# Patient Record
Sex: MALE | Race: WHITE | NOT HISPANIC OR LATINO | URBAN - NONMETROPOLITAN AREA
[De-identification: names, ages, dates, MRNs, and addresses within clinical notes are randomized per-mention and may not be internally consistent; named-entity substitution may affect disease eponyms.]

---

## 2019-04-26 ENCOUNTER — OFFICE VISIT CONVERTED (OUTPATIENT)
Dept: FAMILY MEDICINE CLINIC | Facility: CLINIC | Age: 69
End: 2019-04-26
Attending: NURSE PRACTITIONER

## 2019-04-26 ENCOUNTER — HOSPITAL ENCOUNTER (OUTPATIENT)
Dept: GENERAL RADIOLOGY | Facility: HOSPITAL | Age: 69
Discharge: HOME | End: 2019-04-26
Attending: NURSE PRACTITIONER

## 2019-04-26 LAB
ALBUMIN SERPL-MCNC: 4.2 G/DL (ref 3.5–5)
ALBUMIN/GLOB SERPL: 1.2 {RATIO} (ref 1.4–2.6)
ALP SERPL-CCNC: 90 U/L (ref 56–155)
ALT SERPL-CCNC: 17 U/L (ref 10–40)
ANION GAP SERPL CALC-SCNC: 15 MMOL/L (ref 8–19)
AST SERPL-CCNC: 19 U/L (ref 15–50)
BASOPHILS # BLD AUTO: 0.07 10*3/UL (ref 0–0.2)
BASOPHILS NFR BLD AUTO: 0.8 % (ref 0–3)
BILIRUB SERPL-MCNC: 0.44 MG/DL (ref 0.2–1.3)
BUN SERPL-MCNC: 15 MG/DL (ref 5–25)
BUN/CREAT SERPL: 17 {RATIO} (ref 6–20)
CALCIUM SERPL-MCNC: 9.3 MG/DL (ref 8.7–10.4)
CHLORIDE SERPL-SCNC: 98 MMOL/L (ref 99–111)
CHOLEST SERPL-MCNC: 202 MG/DL (ref 107–200)
CHOLEST/HDLC SERPL: 4.1 {RATIO} (ref 3–6)
CONV ABS IMM GRAN: 0.04 10*3/UL (ref 0–0.2)
CONV CO2: 29 MMOL/L (ref 22–32)
CONV IMMATURE GRAN: 0.5 % (ref 0–1.8)
CONV TOTAL PROTEIN: 7.6 G/DL (ref 6.3–8.2)
CREAT UR-MCNC: 0.89 MG/DL (ref 0.7–1.2)
DEPRECATED RDW RBC AUTO: 49.4 FL (ref 35.1–43.9)
EOSINOPHIL # BLD AUTO: 0.31 10*3/UL (ref 0–0.7)
EOSINOPHIL # BLD AUTO: 3.7 % (ref 0–7)
ERYTHROCYTE [DISTWIDTH] IN BLOOD BY AUTOMATED COUNT: 14.6 % (ref 11.6–14.4)
EST. AVERAGE GLUCOSE BLD GHB EST-MCNC: 123 MG/DL
GFR SERPLBLD BASED ON 1.73 SQ M-ARVRAT: >60 ML/MIN/{1.73_M2}
GLOBULIN UR ELPH-MCNC: 3.4 G/DL (ref 2–3.5)
GLUCOSE SERPL-MCNC: 114 MG/DL (ref 70–99)
HBA1C MFR BLD: 14.7 G/DL (ref 14–18)
HBA1C MFR BLD: 5.9 % (ref 3.5–5.7)
HCT VFR BLD AUTO: 48.1 % (ref 42–52)
HDLC SERPL-MCNC: 49 MG/DL (ref 40–60)
LDLC SERPL CALC-MCNC: 119 MG/DL (ref 70–100)
LYMPHOCYTES # BLD AUTO: 1.63 10*3/UL (ref 1–5)
MCH RBC QN AUTO: 28.1 PG (ref 27–31)
MCHC RBC AUTO-ENTMCNC: 30.6 G/DL (ref 33–37)
MCV RBC AUTO: 92 FL (ref 80–96)
MONOCYTES # BLD AUTO: 0.58 10*3/UL (ref 0.2–1.2)
MONOCYTES NFR BLD AUTO: 6.9 % (ref 3–10)
NEUTROPHILS # BLD AUTO: 5.75 10*3/UL (ref 2–8)
NEUTROPHILS NFR BLD AUTO: 68.6 % (ref 30–85)
NRBC CBCN: 0 % (ref 0–0.7)
OSMOLALITY SERPL CALC.SUM OF ELEC: 288 MOSM/KG (ref 273–304)
PLATELET # BLD AUTO: 347 10*3/UL (ref 130–400)
PMV BLD AUTO: 9.7 FL (ref 9.4–12.4)
POTASSIUM SERPL-SCNC: 4.2 MMOL/L (ref 3.5–5.3)
RBC # BLD AUTO: 5.23 10*6/UL (ref 4.7–6.1)
SODIUM SERPL-SCNC: 138 MMOL/L (ref 135–147)
TRIGL SERPL-MCNC: 172 MG/DL (ref 40–150)
TSH SERPL-ACNC: 1.79 M[IU]/L (ref 0.27–4.2)
VARIANT LYMPHS NFR BLD MANUAL: 19.5 % (ref 20–45)
VLDLC SERPL-MCNC: 34 MG/DL (ref 5–37)
WBC # BLD AUTO: 8.38 10*3/UL (ref 4.8–10.8)

## 2019-04-27 LAB
CONV HEPATITIS C AB WITH REFLEX TO CONFIRMATION: <0.1 S/CO RATIO (ref 0–0.9)
CONV HEPATITIS COMMENT: NORMAL

## 2019-05-14 ENCOUNTER — OFFICE VISIT CONVERTED (OUTPATIENT)
Dept: FAMILY MEDICINE CLINIC | Facility: CLINIC | Age: 69
End: 2019-05-14
Attending: NURSE PRACTITIONER

## 2020-01-16 ENCOUNTER — OFFICE VISIT CONVERTED (OUTPATIENT)
Dept: FAMILY MEDICINE CLINIC | Facility: CLINIC | Age: 70
End: 2020-01-16
Attending: FAMILY MEDICINE

## 2020-01-21 ENCOUNTER — HOSPITAL ENCOUNTER (OUTPATIENT)
Dept: GENERAL RADIOLOGY | Facility: HOSPITAL | Age: 70
Discharge: HOME | End: 2020-01-21
Attending: NURSE PRACTITIONER

## 2020-01-21 ENCOUNTER — OFFICE VISIT CONVERTED (OUTPATIENT)
Dept: FAMILY MEDICINE CLINIC | Facility: CLINIC | Age: 70
End: 2020-01-21
Attending: NURSE PRACTITIONER

## 2020-01-21 LAB
ALBUMIN SERPL-MCNC: 4 G/DL (ref 3.5–5)
ALBUMIN/GLOB SERPL: 1.2 {RATIO} (ref 1.4–2.6)
ALP SERPL-CCNC: 85 U/L (ref 56–155)
ALT SERPL-CCNC: 26 U/L (ref 10–40)
ANION GAP SERPL CALC-SCNC: 25 MMOL/L (ref 8–19)
AST SERPL-CCNC: 33 U/L (ref 15–50)
BASOPHILS # BLD AUTO: 0.07 10*3/UL (ref 0–0.2)
BASOPHILS NFR BLD AUTO: 0.7 % (ref 0–3)
BILIRUB SERPL-MCNC: 0.56 MG/DL (ref 0.2–1.3)
BUN SERPL-MCNC: 16 MG/DL (ref 5–25)
BUN/CREAT SERPL: 16 {RATIO} (ref 6–20)
CALCIUM SERPL-MCNC: 9.6 MG/DL (ref 8.7–10.4)
CHLORIDE SERPL-SCNC: 99 MMOL/L (ref 99–111)
CHOLEST SERPL-MCNC: 184 MG/DL (ref 107–200)
CHOLEST/HDLC SERPL: 4.2 {RATIO} (ref 3–6)
CONV ABS IMM GRAN: 0.12 10*3/UL (ref 0–0.2)
CONV CO2: 19 MMOL/L (ref 22–32)
CONV IMMATURE GRAN: 1.2 % (ref 0–1.8)
CONV TOTAL PROTEIN: 7.3 G/DL (ref 6.3–8.2)
CREAT UR-MCNC: 0.98 MG/DL (ref 0.7–1.2)
DEPRECATED RDW RBC AUTO: 50.8 FL (ref 35.1–43.9)
EOSINOPHIL # BLD AUTO: 0.63 10*3/UL (ref 0–0.7)
EOSINOPHIL # BLD AUTO: 6 % (ref 0–7)
ERYTHROCYTE [DISTWIDTH] IN BLOOD BY AUTOMATED COUNT: 15.5 % (ref 11.6–14.4)
EST. AVERAGE GLUCOSE BLD GHB EST-MCNC: 131 MG/DL
GFR SERPLBLD BASED ON 1.73 SQ M-ARVRAT: >60 ML/MIN/{1.73_M2}
GLOBULIN UR ELPH-MCNC: 3.3 G/DL (ref 2–3.5)
GLUCOSE SERPL-MCNC: 120 MG/DL (ref 70–99)
HBA1C MFR BLD: 6.2 % (ref 3.5–5.7)
HCT VFR BLD AUTO: 52.4 % (ref 42–52)
HDLC SERPL-MCNC: 44 MG/DL (ref 40–60)
HGB BLD-MCNC: 16 G/DL (ref 14–18)
LDLC SERPL CALC-MCNC: 106 MG/DL (ref 70–100)
LYMPHOCYTES # BLD AUTO: 1.54 10*3/UL (ref 1–5)
LYMPHOCYTES NFR BLD AUTO: 14.8 % (ref 20–45)
MCH RBC QN AUTO: 27.8 PG (ref 27–31)
MCHC RBC AUTO-ENTMCNC: 30.5 G/DL (ref 33–37)
MCV RBC AUTO: 91.1 FL (ref 80–96)
MONOCYTES # BLD AUTO: 0.78 10*3/UL (ref 0.2–1.2)
MONOCYTES NFR BLD AUTO: 7.5 % (ref 3–10)
NEUTROPHILS # BLD AUTO: 7.29 10*3/UL (ref 2–8)
NEUTROPHILS NFR BLD AUTO: 69.8 % (ref 30–85)
NRBC CBCN: 0 % (ref 0–0.7)
OSMOLALITY SERPL CALC.SUM OF ELEC: 290 MOSM/KG (ref 273–304)
PLATELET # BLD AUTO: 397 10*3/UL (ref 130–400)
PMV BLD AUTO: 9 FL (ref 9.4–12.4)
POTASSIUM SERPL-SCNC: 4.4 MMOL/L (ref 3.5–5.3)
PSA SERPL-MCNC: 3.56 NG/ML (ref 0–4)
RBC # BLD AUTO: 5.75 10*6/UL (ref 4.7–6.1)
SODIUM SERPL-SCNC: 139 MMOL/L (ref 135–147)
TRIGL SERPL-MCNC: 170 MG/DL (ref 40–150)
TSH SERPL-ACNC: 2.59 M[IU]/L (ref 0.27–4.2)
VLDLC SERPL-MCNC: 34 MG/DL (ref 5–37)
WBC # BLD AUTO: 10.43 10*3/UL (ref 4.8–10.8)

## 2020-02-04 ENCOUNTER — OFFICE VISIT CONVERTED (OUTPATIENT)
Dept: FAMILY MEDICINE CLINIC | Facility: CLINIC | Age: 70
End: 2020-02-04
Attending: NURSE PRACTITIONER

## 2020-02-11 ENCOUNTER — HOSPITAL ENCOUNTER (OUTPATIENT)
Dept: GENERAL RADIOLOGY | Facility: HOSPITAL | Age: 70
Discharge: HOME | End: 2020-02-11
Attending: NURSE PRACTITIONER

## 2021-05-15 VITALS
HEART RATE: 114 BPM | DIASTOLIC BLOOD PRESSURE: 63 MMHG | WEIGHT: 315 LBS | BODY MASS INDEX: 41.75 KG/M2 | SYSTOLIC BLOOD PRESSURE: 152 MMHG | TEMPERATURE: 97.3 F | OXYGEN SATURATION: 94 % | RESPIRATION RATE: 18 BRPM | HEIGHT: 73 IN

## 2021-05-15 VITALS
RESPIRATION RATE: 20 BRPM | BODY MASS INDEX: 41.75 KG/M2 | TEMPERATURE: 97.6 F | SYSTOLIC BLOOD PRESSURE: 156 MMHG | OXYGEN SATURATION: 98 % | DIASTOLIC BLOOD PRESSURE: 62 MMHG | HEIGHT: 73 IN | WEIGHT: 315 LBS | HEART RATE: 77 BPM

## 2021-05-15 VITALS
TEMPERATURE: 97.8 F | OXYGEN SATURATION: 99 % | SYSTOLIC BLOOD PRESSURE: 144 MMHG | WEIGHT: 315 LBS | RESPIRATION RATE: 20 BRPM | HEIGHT: 73 IN | HEART RATE: 80 BPM | BODY MASS INDEX: 41.75 KG/M2 | DIASTOLIC BLOOD PRESSURE: 74 MMHG

## 2021-05-15 VITALS
HEIGHT: 73 IN | OXYGEN SATURATION: 98 % | DIASTOLIC BLOOD PRESSURE: 74 MMHG | RESPIRATION RATE: 20 BRPM | SYSTOLIC BLOOD PRESSURE: 144 MMHG | HEART RATE: 72 BPM | WEIGHT: 315 LBS | TEMPERATURE: 97.5 F | BODY MASS INDEX: 41.75 KG/M2

## 2021-05-15 VITALS
BODY MASS INDEX: 41.75 KG/M2 | DIASTOLIC BLOOD PRESSURE: 79 MMHG | RESPIRATION RATE: 20 BRPM | OXYGEN SATURATION: 98 % | TEMPERATURE: 97.9 F | SYSTOLIC BLOOD PRESSURE: 136 MMHG | HEIGHT: 73 IN | HEART RATE: 86 BPM | WEIGHT: 315 LBS

## 2023-05-19 ENCOUNTER — APPOINTMENT (OUTPATIENT)
Dept: CARDIOLOGY | Facility: HOSPITAL | Age: 73
End: 2023-05-19
Payer: MEDICAID

## 2023-05-19 ENCOUNTER — HOSPITAL ENCOUNTER (OUTPATIENT)
Facility: HOSPITAL | Age: 73
Setting detail: OBSERVATION
LOS: 1 days | Discharge: HOME | End: 2023-05-20
Attending: EMERGENCY MEDICINE | Admitting: FAMILY MEDICINE
Payer: MEDICAID

## 2023-05-19 DIAGNOSIS — I82.412 ACUTE DEEP VEIN THROMBOSIS (DVT) OF FEMORAL VEIN OF LEFT LOWER EXTREMITY: Primary | ICD-10-CM

## 2023-05-19 LAB
ALBUMIN SERPL-MCNC: 3.7 G/DL (ref 3.5–5.2)
ALBUMIN/GLOB SERPL: 1 G/DL
ALP SERPL-CCNC: 96 U/L (ref 39–117)
ALT SERPL W P-5'-P-CCNC: 16 U/L (ref 1–41)
ANION GAP SERPL CALCULATED.3IONS-SCNC: 10.5 MMOL/L (ref 5–15)
AST SERPL-CCNC: 23 U/L (ref 1–40)
BASOPHILS # BLD AUTO: 0.1 10*3/MM3 (ref 0–0.2)
BASOPHILS NFR BLD AUTO: 0.8 % (ref 0–1.5)
BILIRUB SERPL-MCNC: 0.7 MG/DL (ref 0–1.2)
BUN SERPL-MCNC: 12 MG/DL (ref 8–23)
BUN/CREAT SERPL: 15.4 (ref 7–25)
CALCIUM SPEC-SCNC: 9.2 MG/DL (ref 8.6–10.5)
CHLORIDE SERPL-SCNC: 100 MMOL/L (ref 98–107)
CO2 SERPL-SCNC: 27.5 MMOL/L (ref 22–29)
CREAT SERPL-MCNC: 0.78 MG/DL (ref 0.76–1.27)
DEPRECATED RDW RBC AUTO: 46 FL (ref 37–54)
EGFRCR SERPLBLD CKD-EPI 2021: 95.3 ML/MIN/1.73
EOSINOPHIL # BLD AUTO: 0.4 10*3/MM3 (ref 0–0.4)
EOSINOPHIL NFR BLD AUTO: 3.2 % (ref 0.3–6.2)
ERYTHROCYTE [DISTWIDTH] IN BLOOD BY AUTOMATED COUNT: 14.3 % (ref 12.3–15.4)
GLOBULIN UR ELPH-MCNC: 3.8 GM/DL
GLUCOSE SERPL-MCNC: 115 MG/DL (ref 65–99)
HCT VFR BLD AUTO: 46.3 % (ref 37.5–51)
HGB BLD-MCNC: 15.3 G/DL (ref 13–17.7)
HOLD SPECIMEN: NORMAL
HOLD SPECIMEN: NORMAL
IMM GRANULOCYTES # BLD AUTO: 0.04 10*3/MM3 (ref 0–0.05)
IMM GRANULOCYTES NFR BLD AUTO: 0.3 % (ref 0–0.5)
INR PPP: 1.09 (ref 0.86–1.15)
LYMPHOCYTES # BLD AUTO: 1.45 10*3/MM3 (ref 0.7–3.1)
LYMPHOCYTES NFR BLD AUTO: 11.6 % (ref 19.6–45.3)
MCH RBC QN AUTO: 29.2 PG (ref 26.6–33)
MCHC RBC AUTO-ENTMCNC: 33 G/DL (ref 31.5–35.7)
MCV RBC AUTO: 88.4 FL (ref 79–97)
MONOCYTES # BLD AUTO: 0.69 10*3/MM3 (ref 0.1–0.9)
MONOCYTES NFR BLD AUTO: 5.5 % (ref 5–12)
NEUTROPHILS NFR BLD AUTO: 78.6 % (ref 42.7–76)
NEUTROPHILS NFR BLD AUTO: 9.87 10*3/MM3 (ref 1.7–7)
NRBC BLD AUTO-RTO: 0 /100 WBC (ref 0–0.2)
PLATELET # BLD AUTO: 407 10*3/MM3 (ref 140–450)
PMV BLD AUTO: 8.4 FL (ref 6–12)
POTASSIUM SERPL-SCNC: 3.7 MMOL/L (ref 3.5–5.2)
PROT SERPL-MCNC: 7.5 G/DL (ref 6–8.5)
PROTHROMBIN TIME: 14.2 SECONDS (ref 11.8–14.9)
RBC # BLD AUTO: 5.24 10*6/MM3 (ref 4.14–5.8)
SODIUM SERPL-SCNC: 138 MMOL/L (ref 136–145)
WBC NRBC COR # BLD: 12.55 10*3/MM3 (ref 3.4–10.8)
WHOLE BLOOD HOLD COAG: NORMAL
WHOLE BLOOD HOLD SPECIMEN: NORMAL

## 2023-05-19 PROCEDURE — 85025 COMPLETE CBC W/AUTO DIFF WBC: CPT

## 2023-05-19 PROCEDURE — 99285 EMERGENCY DEPT VISIT HI MDM: CPT

## 2023-05-19 PROCEDURE — 85610 PROTHROMBIN TIME: CPT

## 2023-05-19 PROCEDURE — 80053 COMPREHEN METABOLIC PANEL: CPT

## 2023-05-19 PROCEDURE — 93971 EXTREMITY STUDY: CPT

## 2023-05-19 PROCEDURE — 36415 COLL VENOUS BLD VENIPUNCTURE: CPT | Performed by: EMERGENCY MEDICINE

## 2023-05-19 PROCEDURE — G0378 HOSPITAL OBSERVATION PER HR: HCPCS

## 2023-05-19 PROCEDURE — 93971 EXTREMITY STUDY: CPT | Performed by: SURGERY

## 2023-05-19 RX ORDER — HEPARIN SOD,PORCINE/0.9 % NACL 25000/250
11.99 INTRAVENOUS SOLUTION INTRAVENOUS
Status: DISCONTINUED | OUTPATIENT
Start: 2023-05-20 | End: 2023-05-20

## 2023-05-19 NOTE — ED PROVIDER NOTES
"Time: 6:19 PM EDT  Date of encounter:  5/19/2023  Independent Historian/Clinical History and Information was obtained by:   Patient  Chief Complaint   Patient presents with   • Leg Pain     Pt was climbing a ladder a few days ago, states he was fine then, now having left leg swelling and pain       History is limited by: N/A    History of Present Illness:  Patient is a 72 y.o. year old male who presents to the emergency department for evaluation of left leg swelling and pain in popliteal fossa.  Patient denies any injury.  States this started several days ago and has progressively worsened.  Denies chest pain or shortness of breath.    HPI    Patient Care Team  Primary Care Provider: No primary care provider on file.    Past Medical History:     Allergies   Allergen Reactions   • Penicillins Unknown - High Severity     No past medical history on file.  No past surgical history on file.  No family history on file.    Home Medications:  Prior to Admission medications    Not on File        Social History:          Review of Systems:  Review of Systems   Cardiovascular: Positive for leg swelling (Left). Negative for chest pain.        Physical Exam:  /66   Pulse 82   Temp 98.4 °F (36.9 °C) (Oral)   Resp 18   Ht 185.4 cm (73\")   Wt (!) 150 kg (330 lb 11 oz)   SpO2 98%   BMI 43.63 kg/m²     Physical Exam  Constitutional:       Appearance: Normal appearance.   HENT:      Head: Normocephalic.   Eyes:      Extraocular Movements: Extraocular movements intact.      Conjunctiva/sclera: Conjunctivae normal.   Pulmonary:      Effort: Pulmonary effort is normal.   Abdominal:      General: There is no distension.   Skin:     General: Skin is warm.      Coloration: Skin is not cyanotic.   Neurological:      Mental Status: He is alert and oriented to person, place, and time.   Psychiatric:         Attention and Perception: Attention and perception normal.         Mood and Affect: Mood normal.            "       Procedures:  Procedures      Medical Decision Making:      Comorbidities that affect care:    {Comorbidities that affect care:94412}    External Notes reviewed:    {External Note review (Optional):84861}      The following orders were placed and all results were independently analyzed by me:  Orders Placed This Encounter   Procedures   • [place] Draw   • Comprehensive Metabolic Panel   • Protime-INR   • CBC Auto Differential   • NPO Diet NPO Type: Strict NPO   • Undress & Gown   • CBC & Differential   • Green Top (Gel)   • Lavender Top   • Gold Top - SST   • Light Blue Top       Medications Given in the Emergency Department:  Medications - No data to display     ED Course:    The patient was initially evaluated in the triage area where orders were placed. The patient was later dispositioned by [facility].      The patient was advised to stay for completion of workup which includes but is not limited to communication of labs and radiological results, reassessment and plan. The patient was advised that leaving prior to disposition by a provider could result in critical findings that are not communicated to the patient.     ED Course as of 05/21/23 1757   Fri May 19, 2023   1915 Per Lesa vascular tech:Venous study is positive for DVT from the left groin and extends all the way down the leg. Positive for SVT left GSV in the thigh. [CW]      ED Course User Index  [CW] Alyson Still APRN       Labs:    Lab Results (last 24 hours)     ** No results found for the last 24 hours. **           Imaging:    No Radiology Exams Resulted Within Past 24 Hours      Differential Diagnosis and Discussion:      {Differentials:48971}    {Independent Review of (Optional):15346}    MDM     {Critical Care:26285}    Patient Care Considerations:    {Considerations (Optional):92700}      Consultants/Shared Management Plan:    {Shared Management Plan (Optional):40679}    Social Determinants of Health:    {Social  Determinants of Health (Optional):09218}      Disposition and Care Coordination:    {Admission consideration:03410}    {Discharge (Optional):30807}    Final diagnoses:   None        ED Disposition     None          This medical record created using voice recognition software.

## 2023-05-20 ENCOUNTER — ANESTHESIA (OUTPATIENT)
Dept: TELEMETRY | Facility: HOSPITAL | Age: 73
End: 2023-05-20

## 2023-05-20 ENCOUNTER — ANESTHESIA EVENT (OUTPATIENT)
Dept: TELEMETRY | Facility: HOSPITAL | Age: 73
End: 2023-05-20

## 2023-05-20 ENCOUNTER — READMISSION MANAGEMENT (OUTPATIENT)
Dept: CALL CENTER | Facility: HOSPITAL | Age: 73
End: 2023-05-20
Payer: MEDICAID

## 2023-05-20 ENCOUNTER — APPOINTMENT (OUTPATIENT)
Dept: CT IMAGING | Facility: HOSPITAL | Age: 73
End: 2023-05-20
Payer: MEDICAID

## 2023-05-20 VITALS
SYSTOLIC BLOOD PRESSURE: 163 MMHG | RESPIRATION RATE: 18 BRPM | HEIGHT: 73 IN | OXYGEN SATURATION: 100 % | DIASTOLIC BLOOD PRESSURE: 70 MMHG | BODY MASS INDEX: 41.75 KG/M2 | TEMPERATURE: 97.9 F | WEIGHT: 315 LBS | HEART RATE: 85 BPM

## 2023-05-20 PROBLEM — I82.412 ACUTE DEEP VEIN THROMBOSIS (DVT) OF FEMORAL VEIN OF LEFT LOWER EXTREMITY: Status: ACTIVE | Noted: 2023-05-20

## 2023-05-20 LAB
APTT PPP: 154.7 SECONDS (ref 78–95.9)
APTT PPP: 28.8 SECONDS (ref 78–95.9)
APTT PPP: 31.6 SECONDS (ref 78–95.9)
BASOPHILS # BLD AUTO: 0.1 10*3/MM3 (ref 0–0.2)
BASOPHILS NFR BLD AUTO: 0.8 % (ref 0–1.5)
BH CV LOW VAS LEFT COMMON FEMORAL SPONT: 1
BH CV LOW VAS LEFT GASTRONEMIUS VESSEL: 1
BH CV LOW VAS LEFT GREATER SAPH AK VESSEL: 1
BH CV LOW VAS LEFT MID FEMORAL SPONT: 1
BH CV LOW VAS LEFT PERONEAL VESSEL: 1
BH CV LOW VAS LEFT POPLITEAL SPONT: 1
BH CV LOW VAS LEFT POSTERIOR TIBIAL VESSEL: 1
BH CV LOW VAS LEFT PROXIMAL FEMORAL SPONT: 1
BH CV LOWER VASCULAR LEFT COMMON FEMORAL AUGMENT: NORMAL
BH CV LOWER VASCULAR LEFT COMMON FEMORAL COMPETENT: NORMAL
BH CV LOWER VASCULAR LEFT COMMON FEMORAL COMPRESS: NORMAL
BH CV LOWER VASCULAR LEFT COMMON FEMORAL PHASIC: NORMAL
BH CV LOWER VASCULAR LEFT COMMON FEMORAL SPONT: NORMAL
BH CV LOWER VASCULAR LEFT COMMON FEMORAL THROMBUS: NORMAL
BH CV LOWER VASCULAR LEFT DISTAL FEMORAL COMPRESS: NORMAL
BH CV LOWER VASCULAR LEFT DISTAL FEMORAL THROMBUS: NORMAL
BH CV LOWER VASCULAR LEFT GASTRONEMIUS COMPRESS: NORMAL
BH CV LOWER VASCULAR LEFT GASTRONEMIUS THROMBUS: NORMAL
BH CV LOWER VASCULAR LEFT GREATER SAPH AK COMPRESS: NORMAL
BH CV LOWER VASCULAR LEFT GREATER SAPH AK THROMBUS: NORMAL
BH CV LOWER VASCULAR LEFT GREATER SAPH BK COMPRESS: NORMAL
BH CV LOWER VASCULAR LEFT LESSER SAPH COMPRESS: NORMAL
BH CV LOWER VASCULAR LEFT MID FEMORAL AUGMENT: NORMAL
BH CV LOWER VASCULAR LEFT MID FEMORAL COMPETENT: NORMAL
BH CV LOWER VASCULAR LEFT MID FEMORAL COMPRESS: NORMAL
BH CV LOWER VASCULAR LEFT MID FEMORAL PHASIC: NORMAL
BH CV LOWER VASCULAR LEFT MID FEMORAL SPONT: NORMAL
BH CV LOWER VASCULAR LEFT MID FEMORAL THROMBUS: NORMAL
BH CV LOWER VASCULAR LEFT PERONEAL COMPRESS: NORMAL
BH CV LOWER VASCULAR LEFT PERONEAL THROMBUS: NORMAL
BH CV LOWER VASCULAR LEFT POPLITEAL AUGMENT: NORMAL
BH CV LOWER VASCULAR LEFT POPLITEAL COMPETENT: NORMAL
BH CV LOWER VASCULAR LEFT POPLITEAL COMPRESS: NORMAL
BH CV LOWER VASCULAR LEFT POPLITEAL PHASIC: NORMAL
BH CV LOWER VASCULAR LEFT POPLITEAL SPONT: NORMAL
BH CV LOWER VASCULAR LEFT POPLITEAL THROMBUS: NORMAL
BH CV LOWER VASCULAR LEFT POSTERIOR TIBIAL AUGMENT: NORMAL
BH CV LOWER VASCULAR LEFT POSTERIOR TIBIAL COMPRESS: NORMAL
BH CV LOWER VASCULAR LEFT POSTERIOR TIBIAL THROMBUS: NORMAL
BH CV LOWER VASCULAR LEFT PROXIMAL FEMORAL AUGMENT: NORMAL
BH CV LOWER VASCULAR LEFT PROXIMAL FEMORAL COMPETENT: NORMAL
BH CV LOWER VASCULAR LEFT PROXIMAL FEMORAL COMPRESS: NORMAL
BH CV LOWER VASCULAR LEFT PROXIMAL FEMORAL PHASIC: NORMAL
BH CV LOWER VASCULAR LEFT PROXIMAL FEMORAL SPONT: NORMAL
BH CV LOWER VASCULAR LEFT PROXIMAL FEMORAL THROMBUS: NORMAL
BH CV LOWER VASCULAR RIGHT COMMON FEMORAL AUGMENT: NORMAL
BH CV LOWER VASCULAR RIGHT COMMON FEMORAL COMPETENT: NORMAL
BH CV LOWER VASCULAR RIGHT COMMON FEMORAL COMPRESS: NORMAL
BH CV LOWER VASCULAR RIGHT COMMON FEMORAL PHASIC: NORMAL
BH CV LOWER VASCULAR RIGHT COMMON FEMORAL SPONT: NORMAL
BH CV VAS PRELIMINARY FINDINGS SCRIPTING: 1
DEPRECATED RDW RBC AUTO: 45.9 FL (ref 37–54)
EOSINOPHIL # BLD AUTO: 0.58 10*3/MM3 (ref 0–0.4)
EOSINOPHIL NFR BLD AUTO: 4.9 % (ref 0.3–6.2)
ERYTHROCYTE [DISTWIDTH] IN BLOOD BY AUTOMATED COUNT: 14.3 % (ref 12.3–15.4)
HCT VFR BLD AUTO: 44.7 % (ref 37.5–51)
HGB BLD-MCNC: 14.7 G/DL (ref 13–17.7)
IMM GRANULOCYTES # BLD AUTO: 0.04 10*3/MM3 (ref 0–0.05)
IMM GRANULOCYTES NFR BLD AUTO: 0.3 % (ref 0–0.5)
LYMPHOCYTES # BLD AUTO: 1.73 10*3/MM3 (ref 0.7–3.1)
LYMPHOCYTES NFR BLD AUTO: 14.5 % (ref 19.6–45.3)
MAXIMAL PREDICTED HEART RATE: 149 BPM
MCH RBC QN AUTO: 28.8 PG (ref 26.6–33)
MCHC RBC AUTO-ENTMCNC: 32.9 G/DL (ref 31.5–35.7)
MCV RBC AUTO: 87.5 FL (ref 79–97)
MONOCYTES # BLD AUTO: 0.78 10*3/MM3 (ref 0.1–0.9)
MONOCYTES NFR BLD AUTO: 6.5 % (ref 5–12)
NEUTROPHILS NFR BLD AUTO: 73 % (ref 42.7–76)
NEUTROPHILS NFR BLD AUTO: 8.7 10*3/MM3 (ref 1.7–7)
NRBC BLD AUTO-RTO: 0 /100 WBC (ref 0–0.2)
PLATELET # BLD AUTO: 407 10*3/MM3 (ref 140–450)
PMV BLD AUTO: 8.1 FL (ref 6–12)
RBC # BLD AUTO: 5.11 10*6/MM3 (ref 4.14–5.8)
STRESS TARGET HR: 127 BPM
WBC NRBC COR # BLD: 11.93 10*3/MM3 (ref 3.4–10.8)

## 2023-05-20 PROCEDURE — G0378 HOSPITAL OBSERVATION PER HR: HCPCS

## 2023-05-20 PROCEDURE — 99204 OFFICE O/P NEW MOD 45 MIN: CPT | Performed by: SURGERY

## 2023-05-20 PROCEDURE — 25010000002 HEPARIN (PORCINE) PER 1000 UNITS: Performed by: FAMILY MEDICINE

## 2023-05-20 PROCEDURE — 94761 N-INVAS EAR/PLS OXIMETRY MLT: CPT

## 2023-05-20 PROCEDURE — 96365 THER/PROPH/DIAG IV INF INIT: CPT

## 2023-05-20 PROCEDURE — 99236 HOSP IP/OBS SAME DATE HI 85: CPT | Performed by: INTERNAL MEDICINE

## 2023-05-20 PROCEDURE — 25010000002 HEPARIN (PORCINE) PER 1000 UNITS: Performed by: NURSE PRACTITIONER

## 2023-05-20 PROCEDURE — 71260 CT THORAX DX C+: CPT

## 2023-05-20 PROCEDURE — 96366 THER/PROPH/DIAG IV INF ADDON: CPT

## 2023-05-20 PROCEDURE — 85730 THROMBOPLASTIN TIME PARTIAL: CPT | Performed by: NURSE PRACTITIONER

## 2023-05-20 PROCEDURE — 85730 THROMBOPLASTIN TIME PARTIAL: CPT | Performed by: INTERNAL MEDICINE

## 2023-05-20 PROCEDURE — 85025 COMPLETE CBC W/AUTO DIFF WBC: CPT | Performed by: NURSE PRACTITIONER

## 2023-05-20 PROCEDURE — 25510000001 IOPAMIDOL PER 1 ML: Performed by: FAMILY MEDICINE

## 2023-05-20 PROCEDURE — 94799 UNLISTED PULMONARY SVC/PX: CPT

## 2023-05-20 PROCEDURE — 96376 TX/PRO/DX INJ SAME DRUG ADON: CPT

## 2023-05-20 RX ORDER — AMOXICILLIN 250 MG
2 CAPSULE ORAL 2 TIMES DAILY
Status: DISCONTINUED | OUTPATIENT
Start: 2023-05-20 | End: 2023-05-20 | Stop reason: HOSPADM

## 2023-05-20 RX ORDER — SODIUM CHLORIDE 9 MG/ML
40 INJECTION, SOLUTION INTRAVENOUS AS NEEDED
Status: DISCONTINUED | OUTPATIENT
Start: 2023-05-20 | End: 2023-05-20 | Stop reason: HOSPADM

## 2023-05-20 RX ORDER — BISACODYL 10 MG
10 SUPPOSITORY, RECTAL RECTAL DAILY PRN
Status: DISCONTINUED | OUTPATIENT
Start: 2023-05-20 | End: 2023-05-20 | Stop reason: HOSPADM

## 2023-05-20 RX ORDER — SODIUM CHLORIDE 0.9 % (FLUSH) 0.9 %
10 SYRINGE (ML) INJECTION EVERY 12 HOURS SCHEDULED
Status: DISCONTINUED | OUTPATIENT
Start: 2023-05-20 | End: 2023-05-20 | Stop reason: HOSPADM

## 2023-05-20 RX ORDER — BENZONATATE 100 MG/1
100 CAPSULE ORAL 3 TIMES DAILY PRN
Status: DISCONTINUED | OUTPATIENT
Start: 2023-05-20 | End: 2023-05-20 | Stop reason: HOSPADM

## 2023-05-20 RX ORDER — BISACODYL 5 MG/1
5 TABLET, DELAYED RELEASE ORAL DAILY PRN
Status: DISCONTINUED | OUTPATIENT
Start: 2023-05-20 | End: 2023-05-20 | Stop reason: HOSPADM

## 2023-05-20 RX ORDER — SODIUM CHLORIDE 0.9 % (FLUSH) 0.9 %
10 SYRINGE (ML) INJECTION AS NEEDED
Status: DISCONTINUED | OUTPATIENT
Start: 2023-05-20 | End: 2023-05-20 | Stop reason: HOSPADM

## 2023-05-20 RX ORDER — SODIUM CHLORIDE, SODIUM LACTATE, POTASSIUM CHLORIDE, CALCIUM CHLORIDE 600; 310; 30; 20 MG/100ML; MG/100ML; MG/100ML; MG/100ML
9 INJECTION, SOLUTION INTRAVENOUS CONTINUOUS PRN
Status: CANCELLED | OUTPATIENT
Start: 2023-05-20

## 2023-05-20 RX ORDER — POLYETHYLENE GLYCOL 3350 17 G/17G
17 POWDER, FOR SOLUTION ORAL DAILY PRN
Status: DISCONTINUED | OUTPATIENT
Start: 2023-05-20 | End: 2023-05-20 | Stop reason: HOSPADM

## 2023-05-20 RX ORDER — NITROGLYCERIN 0.4 MG/1
0.4 TABLET SUBLINGUAL
Status: DISCONTINUED | OUTPATIENT
Start: 2023-05-20 | End: 2023-05-20 | Stop reason: HOSPADM

## 2023-05-20 RX ORDER — METOCLOPRAMIDE HYDROCHLORIDE 5 MG/ML
10 INJECTION INTRAMUSCULAR; INTRAVENOUS ONCE AS NEEDED
Status: CANCELLED | OUTPATIENT
Start: 2023-05-20

## 2023-05-20 RX ADMIN — HEPARIN SODIUM 11.99 UNITS/KG/HR: 5000 INJECTION INTRAVENOUS; SUBCUTANEOUS at 01:05

## 2023-05-20 RX ADMIN — Medication 10 ML: at 02:30

## 2023-05-20 RX ADMIN — IOPAMIDOL 100 ML: 755 INJECTION, SOLUTION INTRAVENOUS at 02:24

## 2023-05-20 RX ADMIN — BENZONATATE 100 MG: 100 CAPSULE ORAL at 05:19

## 2023-05-20 RX ADMIN — APIXABAN 10 MG: 5 TABLET, FILM COATED ORAL at 12:38

## 2023-05-20 NOTE — PLAN OF CARE
Problem: Adult Inpatient Plan of Care  Goal: Plan of Care Review  Outcome: Ongoing, Progressing  Goal: Patient-Specific Goal (Individualized)  Outcome: Ongoing, Progressing  Goal: Absence of Hospital-Acquired Illness or Injury  Outcome: Ongoing, Progressing  Intervention: Identify and Manage Fall Risk  Recent Flowsheet Documentation  Taken 5/20/2023 0151 by Gali Santizo RN  Safety Promotion/Fall Prevention: safety round/check completed  Intervention: Prevent Skin Injury  Recent Flowsheet Documentation  Taken 5/20/2023 0151 by Gali Santizo RN  Body Position: position changed independently  Intervention: Prevent and Manage VTE (Venous Thromboembolism) Risk  Recent Flowsheet Documentation  Taken 5/20/2023 0151 by Gali Santizo RN  Activity Management: up ad apryl  Goal: Optimal Comfort and Wellbeing  Outcome: Ongoing, Progressing  Intervention: Provide Person-Centered Care  Recent Flowsheet Documentation  Taken 5/20/2023 0151 by Gali Santizo RN  Trust Relationship/Rapport:   care explained   choices provided   emotional support provided   empathic listening provided   questions answered   questions encouraged   reassurance provided   thoughts/feelings acknowledged  Goal: Readiness for Transition of Care  Outcome: Ongoing, Progressing  Intervention: Mutually Develop Transition Plan  Recent Flowsheet Documentation  Taken 5/20/2023 0154 by Gali Santizo RN  Transportation Anticipated: family or friend will provide  Patient/Family Anticipated Services at Transition: none  Patient/Family Anticipates Transition to: home  Taken 5/20/2023 0148 by Gali Santizo RN  Equipment Currently Used at Home: none     Problem: Hypertension Comorbidity  Goal: Blood Pressure in Desired Range  Outcome: Ongoing, Progressing   Goal Outcome Evaluation:

## 2023-05-20 NOTE — ANESTHESIA PREPROCEDURE EVALUATION
Anesthesia Evaluation     Patient summary reviewed and Nursing notes reviewed   no history of anesthetic complications:  NPO Solid Status: > 8 hours  NPO Liquid Status: > 2 hours           Airway   Mallampati: II  TM distance: >3 FB  Neck ROM: full  No difficulty expected  Dental      Pulmonary - negative pulmonary ROS and normal exam    breath sounds clear to auscultation  Cardiovascular - normal exam  Exercise tolerance: good (4-7 METS)    Rhythm: regular  Rate: normal    (+) DVT,       Neuro/Psych- negative ROS  GI/Hepatic/Renal/Endo - negative ROS     Musculoskeletal     Abdominal    Substance History - negative use     OB/GYN negative ob/gyn ROS         Other   arthritis,      ROS/Med Hx Other: Left leg pain after standing on ladder a few days ago          Latest Reference Range & Units 05/19/23 19:54   Glucose 65 - 99 mg/dL 115 (H)   Sodium 136 - 145 mmol/L 138   Potassium 3.5 - 5.2 mmol/L 3.7   CO2 22.0 - 29.0 mmol/L 27.5   Chloride 98 - 107 mmol/L 100   Anion Gap 5.0 - 15.0 mmol/L 10.5   Creatinine 0.76 - 1.27 mg/dL 0.78   BUN 8 - 23 mg/dL 12   BUN/Creatinine Ratio 7.0 - 25.0  15.4   Calcium 8.6 - 10.5 mg/dL 9.2   eGFR >60.0 mL/min/1.73 95.3   Alkaline Phosphatase 39 - 117 U/L 96   Total Protein 6.0 - 8.5 g/dL 7.5   ALT (SGPT) 1 - 41 U/L 16   AST (SGOT) 1 - 40 U/L 23   Total Bilirubin 0.0 - 1.2 mg/dL 0.7   Albumin 3.5 - 5.2 g/dL 3.7   Globulin gm/dL 3.8   A/G Ratio g/dL 1.0   (H): Data is abnormally high         Latest Reference Range & Units Most Recent   Hemoglobin 13.0 - 17.7 g/dL 14.7  5/20/23 01:12   Hematocrit 37.5 - 51.0 % 44.7  5/20/23 01:12       IMPRESSION:5/20/23 @ 0223                 1. The study is abnormal.  A moderate amount of acute pulmonary embolism (PE) is seen bilaterally,   predominantly in segmental branches with probably the most proximal pulmonary embolism in the   left-sided interlobar pulmonary artery.  No saddle embolus.  No right ventricular strain is   suggested.  No pulmonary  infarct is identified.    2. No definite acute infiltrate.    3. No other definite significant acute findings.    4. Please see above comments for further detail.      Anesthesia Plan    ASA 4 - emergent     general     (Patient understands anesthesia not responsible for dental damage.    Acute thrombosis left lower extremity and ct scan this am revealing bilateral pulmonary emboli, obtain ekg    Pt at moderate risk of dislodging of thrombosis)  intravenous induction     Anesthetic plan, risks, benefits, and alternatives have been provided, discussed and informed consent has been obtained with: patient.    Use of blood products discussed with patient .   Plan discussed with CRNA.        CODE STATUS:    Level Of Support Discussed With: Patient  Code Status (Patient has no pulse and is not breathing): CPR (Attempt to Resuscitate)  Medical Interventions (Patient has pulse or is breathing): Full Support

## 2023-05-20 NOTE — H&P
" [facility] ERIST HISTORY AND PHYSICAL  Date: 2023   Patient Name: Renny Damon  : 1950  MRN: [id number]  Primary Care Physician:  Lilia Edwards, [facility]  Date of admission: 2023    Subjective   Subjective     Chief Complaint: Left leg pain    HPI:    Renny Damon is a 72 y.o. male brought to the emergency department for evaluation of left lower extremity swelling and pain.  Patient states this started on Tuesday while climbing a ladder.  Patient states he has \"bad knees\".  Denies any falls, traumas, injuries to the area.  States the pain is located behind his knee as well.  Pain and swelling has progressively worsened, and has been getting short of breath with exertion.  Due to the severity of the pain, patient presented to emergency department for evaluation.  Patient denies any other symptoms.      Personal History     Past Medical History:  Osteoarthritis  Hard of hearing  History hemorrhoids  Tinnitus  History of testicle lump    Past Surgical History:  Umbilical hernia repair    Family History:   Coronary artery disease    Social History:   Patient has a 30-pack-year smoking history, does not currently smoke.  Denies any tobacco or alcohol consumption.    Home Medications: Currently being followed by nursing staff, will be reviewed when available.       Allergies:  Allergies   Allergen Reactions   • Penicillins Unknown - High Severity       Review of Systems   All systems were reviewed and negative except for: Left lower extremity leg pain and swelling    Objective   Objective     Vitals:   Temp:  [98.4 °F (36.9 °C)] 98.4 °F (36.9 °C)  Heart Rate:  [78-82] 80  Resp:  [18] 18  BP: (149-166)/(66-81) 158/81    Physical Exam    Constitutional: Awake, alert, no acute distress, morbidly obese   Eyes: Pupils equal, sclerae anicteric, no conjunctival injection   HENT: NCAT, mucous membranes moist   Neck: Supple, no thyromegaly, no lymphadenopathy, trachea " midline   Respiratory: Clear to auscultation bilaterally, nonlabored respirations    Cardiovascular: RRR, no murmurs, rubs, or gallops, palpable pedal pulses bilaterally   Gastrointestinal: Positive bowel sounds, soft, nontender, distended secondary to obesity   Musculoskeletal: Left lower extremity pitting edema up to the groin, dusky appearance, 2+ pedal and popliteal pulses appreciated in the left.   Psychiatric: Appropriate affect, cooperative   Neurologic: Oriented x 3, strength symmetric in all extremities, Cranial Nerves grossly intact to confrontation, speech clear   Skin: No rashes     Result Review    Result Review:  I have personally reviewed the results from the time of this admission to 5/20/2023 00:40 EDT and agree with these findings:  [x]  Laboratory  []  Microbiology  [x]  Radiology  [x]  EKG/Telemetry   []  Cardiology/Vascular   []  Pathology  []  Old records  []  Other:      Assessment & Plan   Assessment / Plan     Assessment/Plan:   • Acute left lower extremity DVT-vascular surgery was consulted in the ED, they recommended heparinization, they have agreed to consult in the a.m. heparin drip started in the emergency department, will continue this on the medical floor.   • New systolic ejection murmur-check 2D echo.  Patient does not appear to be in fluid overload or congestive heart failure.  Due to the presence of a significant left lower extremity DVT, will check a CT angiogram of the chest to rule out pulmonary emboli.      DVT prophylaxis:  Medical DVT prophylaxis orders are present.    CODE STATUS:    Level Of Support Discussed With: Patient  Code Status (Patient has no pulse and is not breathing): CPR (Attempt to Resuscitate)  Medical Interventions (Patient has pulse or is breathing): Full Support      Admission Status:  I believe this patient meets inpatient status.    Electronically signed by Cecil Aponte DO, 05/20/23, 12:40 AM EDT.

## 2023-05-20 NOTE — OUTREACH NOTE
Prep Survey    Flowsheet Row Responses   [facility] facility patient discharged from? [facility]   Is LACE score < 7 ? No   Eligibility Not Eligible   What are the reasons patient is not eligible? Other  [Readmission score low]   Does the patient have one of the following disease processes/diagnoses(primary or secondary)? Other   Prep survey completed? Yes          Kerrie RAÍMREZ - Registered Nurse

## 2023-05-20 NOTE — DISCHARGE SUMMARY
[facility]         HOSPITALIST  DISCHARGE SUMMARY    Patient Name: Renny Damon  : 1950  MRN: [id number]    Date of Admission: 2023  Date of Discharge:  23  Primary Care Physician: Lilia Edwards APRN    Consultants:  -Vascular Surgery: Dr. Wang Bianchi    Hospital Problems:  Acute left lower extremity DVT in the common femoral, proximal femoral, mid femoral, distal femoral, popliteal, posterior tibial, peroneal and gastrocnemius  Acute subsegmental PEs without saddle embolus or right heart strain  Obesity (BMI: 44.53)    Hospital Course     Hospital Course:  Renny Damon is a 72 y.o. male who lives independently and came to the ED with complaints of left lower extremity swelling and pain that began on 2023 after patient was climbing a ladder.  Patient denied having any recent surgeries, long travel, trauma or injury to the left lower extremity.  Eval in the ED significant for imaging showing left lower extremity DVT.  Hospitalist service contacted for further evaluation and management.  Vascular surgery consulted.  CTA of the chest obtained and patient also found to have evidence of small subsegmental PEs without saddle embolus or right heart strain.  Patient initially started on heparin drip.  Some cyanosis of left foot was noted.  Vascular surgery evaluated the patient and stated that since there was improvement in patient's swelling and discomfort after initiation of the heparin drip operative therapy for attempted venous thrombectomy not necessary at this time.  They recommended that patient continue on full anticoagulation for period of 3-6 months and they also noted that the patient would benefit from compression either with thigh-high compression stockings to left lower extremity versus Kerlix roll from the foot up to the thigh with an overlying Ace wrap which would be worn daily if possible.  Vascular surgery also recommended that when patient  is not ambulating to maintain his foot up or on a footstool or in a recliner and attempt to avoid swelling.  Additionally a repeat left lower extremity venous duplex could be performed in 3 months to see if there is any improvement of the clot.  Patient hemodynamically stable and no additional inpatient evaluation or work-up necessary at this time.  Patient will discharge home with outpatient follow-up on apixaban 10 mg twice daily for 7 days followed by 5 mg twice daily.    DISCHARGE Follow Up Recommendations for labs and diagnostics:   -Follow-up with PCP in 3 to 5 days    Day of Discharge     Vital Signs:  Temp:  [98.2 °F (36.8 °C)-98.7 °F (37.1 °C)] 98.6 °F (37 °C)  Heart Rate:  [73-88] 79  Resp:  [18] 18  BP: (149-166)/(56-81) 154/56  Physical Exam:   Gen: No acute distress, Conversant, Pleasant, sitting up in bed  Resp: CTAB, No w/r/r, No respiratory distress appreciated  Card: RRR, No m/r/g  Abd: Soft, Nontender, Nondistended, + bowel sounds  Ext: Some cyanosis of left foot noted, left lower extremity in Ace bandage    Discharge Details        Discharge Medications      New Medications      Instructions Start Date   apixaban 5 MG tablet tablet  Commonly known as: ELIQUIS   Take 2 tablets by mouth 2 (Two) Times a Day for 7 days, THEN 1 tablet 2 (Two) Times a Day for 23 days.   Start Date: May 20, 2023            Allergies   Allergen Reactions   • Wasp Venom Swelling   • Bee Venom Swelling   • Penicillins Unknown - High Severity       Discharge Disposition:  Home or Self Care    Diet:  Hospital:  Diet Order   Procedures   • Diet: Regular/House Diet; Texture: Regular Texture (IDDSI 7); Fluid Consistency: Thin (IDDSI 0)       Discharge Activity:   Activity Instructions     Activity as Tolerated            CODE STATUS:  Code Status and Medical Interventions:   Ordered at: 05/20/23 0021     Level Of Support Discussed With:    Patient     Code Status (Patient has no pulse and is not breathing):    CPR (Attempt to  Resuscitate)     Medical Interventions (Patient has pulse or is breathing):    Full Support       No future appointments.    Additional Instructions for the Follow-ups that You Need to Schedule     Discharge Follow-up with PCP   As directed       Currently Documented PCP:    Lilia Edwards APRN    PCP Phone Number:    [phone]     Follow Up Details: Follow-up in 3-5 days               Pertinent  and/or Most Recent Results     RADIOLOGY:  CT Chest With Contrast Diagnostic [[id number]] Noah as Reviewed   Order Status: Completed Collected: 05/20/23 0249    Updated: 05/20/23 0252   Narrative:     PROCEDURE: CT CHEST W CONTRAST DIAGNOSTIC       COMPARISON: None.       INDICATIONS: 72-YEAR-OLD MALE W/ H/O +DVT IN HIS LEFT LEG; POSSIBLE PULMONARY EMBOLISM (PE);   UNKNOWN D-DIMER AT THIS TIME.       TECHNIQUE: After obtaining the patient's consent, 1,037 CT/CTA images were obtained with non-ionic   intravenous contrast material.         PROTOCOL:   Standard pulmonary arteriogram CT/CTA imaging protocol performed.       RADIATION:   Total DLP: 968 mGy*cm.     Automated exposure control was utilized to minimize radiation dose.   CONTRAST: 100 mL Isovue 370 I.V.       FINDINGS: The study is abnormal.  There is probably a moderate amount of acute pulmonary embolism   (PE) present bilaterally predominantly involving segmental branches of the right upper lobe, right   middle lobe, and right lower lobe, as well as the left upper lobe.  There is also involvement of   the left interlobar pulmonary artery as well as segmental branches of the left lower lobe.  No   right ventricular strain is suggested.  No saddle embolus is identified.  No pulmonary infarct is   seen.  No acute infiltrate.  There may be mild age-indeterminate peribronchial thickening,   especially in the lung bases.  No definite suspicious pulmonary nodule.  The central   tracheobronchial tree is well aerated without filling defect.  There is slight motion  artifact on   the study.  Minimal if any pleural effusion is seen.  No pericardial effusion.  No cardiac   enlargement.  No enlarged mediastinal lymph nodes are suggested.  Mild emphysematous changes are   seen, especially paraseptal emphysematous changes within the lung apices.  There is chronic   calcified granulomatous disease of the chest and abdomen.  There is diffuse hepatic steatosis.  No   hepatomegaly is suspected.  Probably no splenomegaly.  There is a small hiatal hernia.  There is   mild nonspecific prominence of the bilateral adrenal glands.  Colonic diverticula are seen without   acute diverticulitis.  There are probably benign renal cysts, which are partially imaged on the   study, and not fully characterized, measuring about 1.7 cm in axial diameter or less.     Atherosclerotic changes involve the thoracoabdominal aorta without aneurysmal dilatation.  No   arterial dissection is suggested.  Please note that the study was not tailored in order to evaluate   the thoracoabdominal aorta.  Degenerative changes are seen throughout the imaged spine.  There may   be diffuse idiopathic skeletal hyperostosis (DISH).  No acute fracture.  No aggressive osseous   lesion is suggested.         Impression:       1. The study is abnormal.  A moderate amount of acute pulmonary embolism (PE) is seen bilaterally,   predominantly in segmental branches with probably the most proximal pulmonary embolism in the   left-sided interlobar pulmonary artery.  No saddle embolus.  No right ventricular strain is   suggested.  No pulmonary infarct is identified.     2. No definite acute infiltrate.     3. No other definite significant acute findings.     4. Please see above comments for further detail.     1.       1.       Pertinent findings were phoned to [facility] ([facility] Nurse caring for the patient, Carolyn) at   approximately 0248 hours on 5/20/2023.         Please note that portions of this note were completed with a voice  recognition program.       ANGELA CALDERA JR, MD         Electronically Signed and Approved By: ANGELA CALDERA JR, MD on 5/20/2023 at 2:49                           Duplex Venous Lower Extremity LEFT [[id number]] Noah as Reviewed   Order Status: Completed Resulted: 05/20/23 0905    Updated: 05/20/23 0909    Target HR (85%) 127 bpm     Max. Pred. HR (100%) 149 bpm     Left Common Femoral Spont 1.0    Left Proximal Femoral Spont 1.0    Left Mid Femoral Spont 1.0    Left Popliteal Spont 1.0    Left Posterior Tibial Vessel 1.0    Left Peroneal Vessel 1.0    Left Gastronemius Vessel 1.0    Left Greater Saph AK Vessel 1.0    Right Common Femoral Spont Y    Right Common Femoral Competent Y    Right Common Femoral Phasic Y    Right Common Femoral Compress C    Right Common Femoral Augment Y    Left Common Femoral Spont N    Left Common Femoral Competent N    Left Common Femoral Phasic N    Left Common Femoral Compress N    Left Common Femoral Augment N    Left Common Femoral Thrombus A    Left Proximal Femoral Spont N    Left Proximal Femoral Competent N    Left Proximal Femoral Phasic N    Left Proximal Femoral Compress N    Left Proximal Femoral Augment N    Left Proximal Femoral Thrombus A    Left Mid Femoral Spont N    Left Mid Femoral Competent N    Left Mid Femoral Phasic N    Left Mid Femoral Compress N    Left Mid Femoral Augment N    Left Mid Femoral Thrombus A    Left Distal Femoral Compress C    Left Distal Femoral Thrombus A    Left Popliteal Spont N    Left Popliteal Competent N    Left Popliteal Phasic N    Left Popliteal Compress N    Left Popliteal Augment N    Left Popliteal Thrombus A    Left Posterior Tibial Compress N    Left Posterior Tibial Augment D    Left Posterior Tibial Thrombus A    Left Peroneal Compress N    Left Peroneal Thrombus A    Left Gastronemius Compress N    Left Gastronemius Thrombus A    Left Greater Saph AK Compress N    Left Greater Saph AK Thrombus A    Left Greater Saph BK  Compress C    Left Lesser Saph Compress C    BH CV VAS PRELIMINARY FINDINGS SCRIPTING 1.0   Narrative:     •  Acute left lower extremity deep vein thrombosis noted in the common   femoral, proximal femoral, mid femoral, distal femoral, popliteal,   posterial tibial, peroneal and gastrocnemius.   •  Acute left lower extremity superficial thrombophlebitis noted in the   great saphenous (above knee).          LAB RESULTS:      Lab 05/20/23  0804 05/20/23  0112 05/19/23 1954   WBC  --  11.93* 12.55*   HEMOGLOBIN  --  14.7 15.3   HEMATOCRIT  --  44.7 46.3   PLATELETS  --  407 407   NEUTROS ABS  --  8.70* 9.87*   IMMATURE GRANS (ABS)  --  0.04 0.04   LYMPHS ABS  --  1.73 1.45   MONOS ABS  --  0.78 0.69   EOS ABS  --  0.58* 0.40   MCV  --  87.5 88.4   PROTIME  --   --  14.2   APTT 154.7* 28.8*  --          Lab 05/19/23 1954   SODIUM 138   POTASSIUM 3.7   CHLORIDE 100   CO2 27.5   ANION GAP 10.5   BUN 12   CREATININE 0.78   EGFR 95.3   GLUCOSE 115*   CALCIUM 9.2         Lab 05/19/23 1954   TOTAL PROTEIN 7.5   ALBUMIN 3.7   GLOBULIN 3.8   ALT (SGPT) 16   AST (SGOT) 23   BILIRUBIN 0.7   ALK PHOS 96         Lab 05/19/23 1954   PROTIME 14.2   INR 1.09                 Brief Urine Lab Results     None        Microbiology Results (last 10 days)     ** No results found for the last 240 hours. **            Results for orders placed during the hospital encounter of 05/19/23    Duplex Venous Lower Extremity LEFT    Interpretation Summary  •  Acute left lower extremity deep vein thrombosis noted in the common femoral, proximal femoral, mid femoral, distal femoral, popliteal, posterial tibial, peroneal and gastrocnemius.  •  Acute left lower extremity superficial thrombophlebitis noted in the great saphenous (above knee).      Results for orders placed during the hospital encounter of 05/19/23    Duplex Venous Lower Extremity LEFT    Interpretation Summary  •  Acute left lower extremity deep vein thrombosis noted in the common  femoral, proximal femoral, mid femoral, distal femoral, popliteal, posterial tibial, peroneal and gastrocnemius.  •  Acute left lower extremity superficial thrombophlebitis noted in the great saphenous (above knee).          Labs Pending at Discharge:  Pending Labs     Order Current Status    aPTT Collected (05/20/23 1116)          Time spent on Discharge including face to face service: Greater than 30 minutes    Electronically signed by Bairon Haile MD, 05/20/23, 11:21 AM EDT.

## 2023-05-20 NOTE — CONSULTS
[facility]   VASCULAR SURGERY CONSULT    Patient Name: Renny Damon  : 1950  MRN: [id number]  Primary Care Physician:  Lilia Edwards APRN  Date of admission: 2023    Subjective   Subjective     Chief Complaint: Left lower extremity extensive DVT    HPI:    Renny Damon is a 72 y.o. male who lives independently but was brought to the emergency department for left lower extremity swelling and pain.  Per the patient this began on Tuesday after climbing a ladder and initially the patient just attributed it to his bad knees.  He is not having any recent surgeries, long travel, trauma or injury to the left lower extremity.  As such this may be an unprovoked left lower extremity DVT.  Does not have a history of any thrombus or clots in the past and no significant family history is known.   Patient presented to the hospital due to increasing pain and swelling to the left lower extremity and some shortness of breath.  He underwent a CTA of the chest and was found to have evidence of small subsegmental PEs without saddle embolus or right heart strain.  He has denied any other significant symptoms.    Review of Systems    General: Patient denies unintended weight loss or weight gain.  Denies fatigue, and weakness.  Denies any fevers, chills or night sweats.  HEENT: Denies any recent visual or hearing changes.  Denies any headaches, rhinorrhea or epistaxis.  Cardiac: Patient denies any significant chest pain, chest tightness or palpitations.  Denies dyspnea on exertion or denies any paroxysmal nocturnal dyspnea orthopnea.  Respiratory: Patient denies any shortness of breath, cough, sputum production or hemoptysis.    Gastrointestinal: Patient denies any significant changes in appetite, nausea, vomiting or dysphagia.  Denies any hematemesis, melena or hematochezia.  Denies any abdominal pain or tenderness to palpation.  Genitourinary: Patient denies any oliguria, dysuria or  hematuria.  Vascular: Patient denies any claudication, rest pain or tissue loss.  Denies any history of thrombosis or embolic phenomenon.  Endocrine: Denies any hot or cold intolerance, is not a diabetic and denies any thyroid problems.  Neurologic: Patient denies any loss of sensation, numbness or tingling.  Denies any syncopal episodes, blackouts or seizure history.  Psychiatric: Patient denies any anxiety, depression or mood disorder.       Personal History     History reviewed. No pertinent past medical history.    Past Surgical History:   Procedure Laterality Date   • HERNIA REPAIR         Family History: family history includes Heart attack in his father; Pancreatic cancer in his mother. Otherwise pertinent FHx was reviewed and not pertinent to current issue.    Social History:  reports that he has quit smoking. His smoking use included cigarettes. His smokeless tobacco use includes chew. He reports current alcohol use of about 3.0 standard drinks per week. He reports that he does not use drugs.    Home Medications:         Allergies:  Allergies   Allergen Reactions   • Wasp Venom Swelling   • Bee Venom Swelling   • Penicillins Unknown - High Severity       Objective   Objective     Vitals:   Temp:  [98.2 °F (36.8 °C)-98.7 °F (37.1 °C)] 98.6 °F (37 °C)  Heart Rate:  [73-88] 79  Resp:  [18] 18  BP: (149-166)/(56-81) 154/56    Physical Exam    General: Awake, alert, NAD   Eyes:  YOJANA, EOMI, Sclera non-icteric   Neck: Supple, no LAD or carotid bruits present   Lungs: Clear to auscultation B   Heart: RRR   Abdomen: Soft, nontender, nondistended with positive bowel sounds   Ext: No clubbing but with cyanosis to the left foot and 1-2+ edema throughout the left lower extremity from the thigh down.  There is no evidence of any toxic or compromised skin or soft tissue.  Patient has dopplerable bilateral pedal signals.  B radial and femoral pulses palp.  No evidence of significant cellulitis or tenderness to  palpation.   Neuro: CN's II-XII grossly intact.  No focal or lateralizing deficits present currently.  Left lower extremity remains sensorimotor intact.    Pertinent Lab Data:    CBC:      Lab 05/20/23  0112 05/19/23 1954   WBC 11.93* 12.55*   HEMOGLOBIN 14.7 15.3   HEMATOCRIT 44.7 46.3   PLATELETS 407 407   NEUTROS ABS 8.70* 9.87*   IMMATURE GRANS (ABS) 0.04 0.04   LYMPHS ABS 1.73 1.45   MONOS ABS 0.78 0.69   EOS ABS 0.58* 0.40   MCV 87.5 88.4        CMP:      Lab 05/19/23 1954   SODIUM 138   POTASSIUM 3.7   CHLORIDE 100   CO2 27.5   ANION GAP 10.5   BUN 12   CREATININE 0.78   EGFR 95.3   GLUCOSE 115*   CALCIUM 9.2   TOTAL PROTEIN 7.5   ALBUMIN 3.7   GLOBULIN 3.8   ALT (SGPT) 16   AST (SGOT) 23   BILIRUBIN 0.7   ALK PHOS 96        CT Chest With Contrast Diagnostic    Result Date: 5/20/2023    1. The study is abnormal.  A moderate amount of acute pulmonary embolism (PE) is seen bilaterally, predominantly in segmental branches with probably the most proximal pulmonary embolism in the left-sided interlobar pulmonary artery.  No saddle embolus.  No right ventricular strain is suggested.  No pulmonary infarct is identified.  2. No definite acute infiltrate.  3. No other definite significant acute findings.  4. Please see above comments for further detail.  1.  1.  Pertinent findings were phoned to [facility] ([facility] Nurse caring for the patient, Carolyn) at approximately 0248 hours on 5/20/2023.   Please note that portions of this note were completed with a voice recognition program.  ANGELA CALDERA JR, MD       Electronically Signed and Approved By: ANGELA CALDERA JR, MD on 5/20/2023 at 2:49                  Assessment & Plan   Assessment / Plan     Active Hospital Problems:  Active Hospital Problems    Diagnosis    • **Acute deep vein thrombosis (DVT) of femoral vein of left lower extremity        Assessment/plan:   Patient is a 72-year-old obese  male who presents with left lower extremity swelling and some  cyanosis to his left foot.  Fortunately he does not have any significant signs of phlegmasia.  2.  Extensive left lower extremity DVT with evidence of acute thrombus as well as PEs which are not causing any heart strain.  3.  Patient has improved with regards to his symptoms of left lower extremity swelling and discomfort with heparin drip alone as such we will defer any operative therapy for attempted venous thrombectomy.  4.  Discussed the need for full anticoagulation for period of 3 to 6 months with the patient as well as his son and daughter-in-law at bedside.  He can be transition to Eliquis, Xarelto or Coumadin pending insurance coverage per the medicine team and discharged home once his med regimen is initiated and refills are present at the pharmacy.  5.  Patient will benefit from compression either with thigh-high compression stockings to the left lower extremity versus Kerlix roll from the foot up to the thigh with an overlying ace wrap which should be worn daily if possible as well as elevation when he is not ambulating maintaining his foot up on a footstool or in a recliner to avoid swelling.  6.  We will sign off at this time.  May call back with any questions or concerns.  A repeat left lower extremity venous duplex could be performed in 3 months to see if there is any improvement of the clot but he is likely to need to be anticoagulated for 6 months.    Thank you for the opportunity see this patient in consultation.  Electronically signed by Wang Bianchi MD, 05/20/23, 9:02 AM EDT.